# Patient Record
Sex: MALE | Race: OTHER | NOT HISPANIC OR LATINO
[De-identification: names, ages, dates, MRNs, and addresses within clinical notes are randomized per-mention and may not be internally consistent; named-entity substitution may affect disease eponyms.]

---

## 2017-09-19 PROBLEM — Z00.00 ENCOUNTER FOR PREVENTIVE HEALTH EXAMINATION: Status: ACTIVE | Noted: 2017-09-19

## 2017-09-29 ENCOUNTER — APPOINTMENT (OUTPATIENT)
Dept: NUCLEAR MEDICINE | Facility: CLINIC | Age: 72
End: 2017-09-29
Payer: MEDICARE

## 2017-09-29 ENCOUNTER — OUTPATIENT (OUTPATIENT)
Dept: OUTPATIENT SERVICES | Facility: HOSPITAL | Age: 72
LOS: 1 days | End: 2017-09-29

## 2017-09-29 DIAGNOSIS — Z85.46 PERSONAL HISTORY OF MALIGNANT NEOPLASM OF PROSTATE: ICD-10-CM

## 2017-09-29 PROCEDURE — 78306 BONE IMAGING WHOLE BODY: CPT | Mod: 26

## 2017-09-29 PROCEDURE — 78999 UNLISTED MISC PX DX NUC MED: CPT | Mod: 26

## 2017-10-09 ENCOUNTER — OUTPATIENT (OUTPATIENT)
Dept: OUTPATIENT SERVICES | Facility: HOSPITAL | Age: 72
LOS: 1 days | End: 2017-10-09
Payer: COMMERCIAL

## 2017-10-09 ENCOUNTER — APPOINTMENT (OUTPATIENT)
Dept: CT IMAGING | Facility: CLINIC | Age: 72
End: 2017-10-09
Payer: MEDICARE

## 2017-10-09 DIAGNOSIS — Z00.8 ENCOUNTER FOR OTHER GENERAL EXAMINATION: ICD-10-CM

## 2017-10-09 PROCEDURE — 71260 CT THORAX DX C+: CPT | Mod: 26

## 2017-10-09 PROCEDURE — 74177 CT ABD & PELVIS W/CONTRAST: CPT | Mod: 26

## 2017-10-10 PROCEDURE — 82565 ASSAY OF CREATININE: CPT

## 2017-10-10 PROCEDURE — 71260 CT THORAX DX C+: CPT

## 2017-10-10 PROCEDURE — 74177 CT ABD & PELVIS W/CONTRAST: CPT

## 2017-11-08 ENCOUNTER — RECORD ABSTRACTING (OUTPATIENT)
Age: 72
End: 2017-11-08

## 2017-11-08 DIAGNOSIS — Z85.46 PERSONAL HISTORY OF MALIGNANT NEOPLASM OF PROSTATE: ICD-10-CM

## 2017-11-08 DIAGNOSIS — I35.8 OTHER NONRHEUMATIC AORTIC VALVE DISORDERS: ICD-10-CM

## 2017-11-08 DIAGNOSIS — I35.1 NONRHEUMATIC AORTIC (VALVE) INSUFFICIENCY: ICD-10-CM

## 2017-11-08 DIAGNOSIS — I77.89 OTHER SPECIFIED DISORDERS OF ARTERIES AND ARTERIOLES: ICD-10-CM

## 2017-11-08 PROBLEM — K76.0 HEPATIC STEATOSIS: Status: ACTIVE | Noted: 2017-11-08

## 2017-11-09 RX ORDER — PNV NO.95/FERROUS FUM/FOLIC AC 28MG-0.8MG
100 TABLET ORAL DAILY
Refills: 0 | Status: ACTIVE | COMMUNITY

## 2017-11-15 ENCOUNTER — APPOINTMENT (OUTPATIENT)
Dept: CARDIOTHORACIC SURGERY | Facility: CLINIC | Age: 72
End: 2017-11-15
Payer: MEDICARE

## 2017-11-15 VITALS
SYSTOLIC BLOOD PRESSURE: 151 MMHG | BODY MASS INDEX: 28.82 KG/M2 | OXYGEN SATURATION: 95 % | HEART RATE: 49 BPM | RESPIRATION RATE: 15 BRPM | DIASTOLIC BLOOD PRESSURE: 82 MMHG | TEMPERATURE: 98.1 F | WEIGHT: 173 LBS | HEIGHT: 65 IN

## 2017-11-15 VITALS — SYSTOLIC BLOOD PRESSURE: 148 MMHG | DIASTOLIC BLOOD PRESSURE: 76 MMHG

## 2017-11-15 DIAGNOSIS — K76.0 FATTY (CHANGE OF) LIVER, NOT ELSEWHERE CLASSIFIED: ICD-10-CM

## 2017-11-15 PROCEDURE — 99205 OFFICE O/P NEW HI 60 MIN: CPT

## 2017-11-15 RX ORDER — CRANBERRY FRUIT EXTRACT 650 MG
CAPSULE ORAL DAILY
Refills: 0 | Status: DISCONTINUED | COMMUNITY
End: 2017-11-15

## 2019-01-02 ENCOUNTER — APPOINTMENT (OUTPATIENT)
Dept: RADIATION ONCOLOGY | Facility: CLINIC | Age: 74
End: 2019-01-02
Payer: MEDICARE

## 2019-01-02 ENCOUNTER — OTHER (OUTPATIENT)
Age: 74
End: 2019-01-02

## 2019-01-02 VITALS
BODY MASS INDEX: 29.45 KG/M2 | HEART RATE: 50 BPM | WEIGHT: 177 LBS | SYSTOLIC BLOOD PRESSURE: 138 MMHG | RESPIRATION RATE: 16 BRPM | DIASTOLIC BLOOD PRESSURE: 80 MMHG

## 2019-01-02 PROCEDURE — 99204 OFFICE O/P NEW MOD 45 MIN: CPT | Mod: 25

## 2019-01-02 RX ORDER — CRANBERRY FRUIT EXTRACT 650 MG
CAPSULE ORAL
Refills: 0 | Status: ACTIVE | COMMUNITY

## 2019-01-02 RX ORDER — METFORMIN HYDROCHLORIDE 500 MG/1
500 TABLET, COATED ORAL
Refills: 0 | Status: DISCONTINUED | COMMUNITY
End: 2019-01-02

## 2019-01-02 RX ORDER — LEUPROLIDE ACETATE 7.5 MG
7.5 KIT INTRAMUSCULAR
Refills: 0 | Status: ACTIVE | COMMUNITY

## 2019-01-02 RX ORDER — TAMSULOSIN HYDROCHLORIDE 0.4 MG/1
0.4 CAPSULE ORAL
Refills: 0 | Status: DISCONTINUED | COMMUNITY
End: 2019-01-02

## 2019-01-02 NOTE — REASON FOR VISIT
[Consideration for Non-Curative Therapy] : consideration for non-curative therapy for [Prostate Cancer] : prostate cancer [Family Member] : family member

## 2019-01-02 NOTE — REVIEW OF SYSTEMS
[Urinary Incontinence: Grade 1 - Occasional (e.g., with coughing, sneezing, etc.), pads not indicated] : Urinary Incontinence: Grade 1 - Occasional (e.g., with coughing, sneezing, etc.), pads not indicated [Urinary Tract Pain: Grade 2 - Moderate pain; limiting instrumental ADL] : Urinary Tract Pain: Grade 2 - Moderate pain; limiting instrumental ADL [Urinary Urgency: Grade 1 - Present] : Urinary Urgency: Grade 1 - Present [Urinary Frequency: Grade 2 - Limiting instrumental ADL; medical management indicated] : Urinary Frequency: Grade 2 - Limiting instrumental ADL; medical management indicated

## 2019-01-02 NOTE — VITALS
[Maximal Pain Intensity: 5/10] : 5/10 [Least Pain Intensity: 0/10] : 0/10 [Pain Description/Quality: ___] : Pain description/quality: [unfilled] [Pain Duration: ___] : Pain duration: [unfilled] [Pain Location: ___] : Pain Location: [unfilled] [Pain Interferes with ADLs] : Pain interferes with activities of daily living. [NoTreatment Scheduled] : no treatment scheduled [80: Normal activity with effort; some signs or symptoms of disease.] : 80: Normal activity with effort; some signs or symptoms of disease.  [ECOG Performance Status: 2 - Ambulatory and capable of all self care but unable to carry out any work activities] : Performance Status: 2 - Ambulatory and capable of all self care but unable to carry out any work activities. Up and about more than 50% of waking hours

## 2019-01-07 NOTE — HISTORY OF PRESENT ILLNESS
[FreeTextEntry1] : Sultan Laguerre is a 74 y/o male with CRPC, here with his daughter, to discuss possible Xofigo treatment.  Pt received ERT  in 2011 at [facility]  as well as Lupron.  Pt was feeling better, PSA went down and pt moved and did not followup for an extended time. Is on Firmagon and Xgeva. Since 2015 pt saw Dr Ajay Castillo, and had RT to T7-L1 spine mets with Dr Anmol Tate,completed 12/7/18.  As per Dr Anmol Salzaar's note of 12/7/18 , MRI showed mets at T8-T10 and T12 vertebrae. Axumin PET /CT 9/14/18 showed multiple bilateral pelvic adenopathy, T-9 met and possibly lung mets (nodules).Pt is on Lupron injections monthly and the PSA began rising,9.6 on 8/24/18,12.3 on 10/3/18. Pt's last PSA  12/20/18 was 22.4. Bone scan 9/19/17 showed T-9 metastasis. Pt has long-standing painful urination with urgency and some incontinence requiring a diaper. Denies incontinence,hematuria,melena. No spine/rib,limb bone pain.

## 2019-01-11 ENCOUNTER — OUTPATIENT (OUTPATIENT)
Dept: OUTPATIENT SERVICES | Facility: HOSPITAL | Age: 74
LOS: 1 days | Discharge: HOME | End: 2019-01-11

## 2019-01-11 DIAGNOSIS — C61 MALIGNANT NEOPLASM OF PROSTATE: ICD-10-CM

## 2019-01-16 ENCOUNTER — APPOINTMENT (OUTPATIENT)
Dept: HEMATOLOGY ONCOLOGY | Facility: CLINIC | Age: 74
End: 2019-01-16
Payer: MEDICARE

## 2019-01-16 VITALS
DIASTOLIC BLOOD PRESSURE: 81 MMHG | HEART RATE: 78 BPM | WEIGHT: 169.75 LBS | SYSTOLIC BLOOD PRESSURE: 143 MMHG | BODY MASS INDEX: 28.63 KG/M2 | OXYGEN SATURATION: 98 % | RESPIRATION RATE: 16 BRPM | HEIGHT: 64.57 IN | TEMPERATURE: 98 F

## 2019-01-16 DIAGNOSIS — Z83.79 FAMILY HISTORY OF OTHER DISEASES OF THE DIGESTIVE SYSTEM: ICD-10-CM

## 2019-01-16 DIAGNOSIS — C61 MALIGNANT NEOPLASM OF PROSTATE: ICD-10-CM

## 2019-01-16 DIAGNOSIS — Z84.89 FAMILY HISTORY OF OTHER SPECIFIED CONDITIONS: ICD-10-CM

## 2019-01-16 DIAGNOSIS — C77.2 MALIGNANT NEOPLASM OF PROSTATE: ICD-10-CM

## 2019-01-16 PROCEDURE — 99205 OFFICE O/P NEW HI 60 MIN: CPT

## 2019-01-16 NOTE — RESULTS/DATA
[FreeTextEntry1] : EXAM: NM BONE IMG WHOLE BODY \par EXAM: NM BONE SPECT CT \par PROCEDURE DATE: 09/29/2017 \par INTERPRETATION: RADIOPHARMACEUTICAL: 22.4 mCi Tc-99m HDP, I.V. \par CLINICAL INFORMATION: 72 year old male with prostate carcinoma complaining of back pain; referred to evaluate for osseous metastases. \par TECHNIQUE: Anterior and posterior whole body images were obtained approximately 2 hours following radiopharmaceutical administration. SPECT/CT \par of the thoracolumbar spine also was obtained. A computer workstation was used to obtain composite images for anatomic correlation by precisely \par overlying the SPECT and CT images to generate a fused SPECT/CT image. The CT protocol was optimized for SPECT attenuation correction and to provide \par anatomic detail for localization of SPECT abnormalities. The CT protocol was not designed to produce and cannot replace state-of- the-art diagnostic CT \par images with specific imaging protocols for different body parts and indications. \par COMPARISON: No previous bone scans were available for comparison. \par FINDINGS: There is increased radiopharmaceutical accumulation in the lower thoracic spine, approximately T9, which corresponds to a sclerotic lesion in \par this vertebra on the CT component of the examination. There are degenerative changes in the spine and major joints. There is physiologic distribution of \par radiopharmaceutical throughout the remainder of the imaged osseous structure\par Both kidneys are visualized and are symmetric in appearance. \par IMPRESSION: Abnormal bone scan. Metastatic disease involving the lower thoracic spine, approximately T9. The remainder the study is unremarkable. \par KEVEN LOERA M.D., CHIEF OF NUCLEAR MEDICINE \par This document has been electronically signed. Sep 29 2017 2:16PM \par *************************************************************\par EXAM: CT ABDOMEN AND PELVIS OC IC \par EXAM: CT CHEST IC \par PROCEDURE DATE: 10/09/2017 \par INTERPRETATION: CLINICAL INFORMATION: Prostate cancer. Ascending aorta dilatation. \par COMPARISON: None. \par PROCEDURE: CT of the Chest, Abdomen and Pelvis was performed with intravenous contrast. Intravenous contrast: 90 ml Omnipaque 350. 10 ml discarded. \par Oral contrast: positive contrast was administered. Sagittal and coronal reformats were performed. \par FINDINGS: \par CHEST: \par LUNGS AND LARGE AIRWAYS: Patent central airways. Numerous bilateral subcentimeter pulmonary nodules with rounded morphology. A few additional \par irregular pulmonary nodules including a 1.2 cm lesion in the right upper lobe (2:21) and a 1.2 cm lesion in the left upper lobe (2:33). \par PLEURA: No pleural effusion. \par VESSELS: Atherosclerotic changes of the aorta and coronary arteries. The ascending aorta measures up to 4.8 cm. \par HEART: Heart size is normal. No pericardial effusion. \par MEDIASTINUM AND THIAGO: Small calcified mediastinal and hilar nodes.. Small hiatal hernia. \par CHEST WALL AND LOWER NECK: Within normal limits. \par ABDOMEN AND PELVIS: \par LIVER: Hepatic steatosis. The caudate lobe is enlarged and there is an expanded periportal space. Findings which may be seen in cirrhosis. \par BILE DUCTS: Normal caliber. \par GALLBLADDER: Within normal limits. \par SPLEEN: Within normal limits. \par PANCREAS: Within normal limits. \par ADRENALS: Within normal limits. \par KIDNEYS/URETERS: Within normal limits. \par BLADDER: Mild diffuse wall thickening may be on the basis of underdistention. \par REPRODUCTIVE ORGANS: Slightly enlarged and heterogeneous in enhancement. \par BOWEL: No bowel obstruction. Appendix is normal. \par PERITONEUM: No ascites. \par VESSELS: Atherosclerotic changes. \par RETROPERITONEUM: A few subcentimeter short axis retroperitoneal and pelvic \par nodes are nonspecific. \par ABDOMINAL WALL: Within normal limits. \par BONES: Degenerative changes. Diffuse sclerosis of T9 concerning for metastatic disease. Old right 10th rib fracture. \par IMPRESSION: \par Diffuse sclerosis of T9 concerning for metastatic disease. \par Numerous pulmonary nodules as above, some of which demonstrate a rounded morphology concerning for metastatic disease. A few additional irregularly \par marginated pulmonary nodules are indeterminate. Other etiologies are not excluded. For short-term follow-up chest CT is recommended. \par Hepatic steatosis with concern for cirrhosis. \par Dilatation of the ascending aorta to 4.8 cm.. \par ARPIT RAMAN M.D., ATTENDING RADIOLOGIST \par This document has been electronically signed. Oct 10 2017 10:53AM

## 2019-01-16 NOTE — HISTORY OF PRESENT ILLNESS
[M: ___] : M[unfilled] [AJCC Stage: ____] : AJCC Stage: [unfilled] [de-identified] : Mr. Laguerre is seen in consultation on January 16, 2019. He was diagnosed with prostate cancer in July of 2011. A biopsy was performed at [facility]. His PSA was 268 at the time of his diagnosis. Every core was involved in almost 100% of each core was involved by Radha 8 tumor. There was perineural invasion as well as vascular invasion noted in the biopsies. He had no evidence of metastases at diagnosis, and he underwent radiation therapy at [facility]. It appears he received hormonal therapy during the treatment with radiation. He was treated with androgen deprivation therapy and a continual basis, and has recently been followed by Dr. Ajay Castillo. He was also seen by Dr. Jeaneth Otero on several occasions. He was referred for consideration of radium-223 and was not considered to be a candidate. I was asked to see him. They informed me today that he received his xtandi in the mail and it has been approved. He has not started it as yet. He has had intermittent dysuria since his diagnosis. In recent times she has nocturia once per hour. He has urgency and occasional incontinence. There is no hematuria. There are no bone pains.  \par Recent MRI revealed metastases at T8-T10 and T12 vertebra. Auxumin PET scan was performed on September 14 showed mild multiple bilateral pelvic lymphadenopathy. The results of the possibility of lung metastases, as he was described as having nodules. His PSA has been rising, and was 9.6 on August 24, 12.3 on October 3, 2018, and on December 20 was 22.4. A bone scan from September showed a T9 metastasis. 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

## 2019-01-16 NOTE — ASSESSMENT
[Palliative Care Plan] : not applicable at this time [FreeTextEntry1] : Mr. Laguerre was seen in consultation today. He was diagnosed with prostate cancer in 2011 and all cores in his biopsy were Radha 8. Almost every core was fully involved with both perineural and lymphovascular invasion noted and some cores. His presenting PSA was 268. Presumably, there was no evidence of metastases at that time as he underwent external beam radiation therapy along with androgen deprivation therapy. He was more or less on continuous androgen deprivation therapy with relatively low PSAs as per his recollection. Earlier this year, his PSA began to rise. He was found to have metastases in the thoracic spine, and he underwent 10 treatments of radiation therapy. He has seen Dr. Otero a few times in the past. He is managed by Dr. Castillo who has been administering degaralix and xgeva. He was referred for radium-223, but he has some lung nodules as well as adenopathy in the pelvis.\par \par He is not been on a second generation androgen inhibitor/androgen receptor modulator. Xtandi was recently approved, and he received a delivery of the product. He has not started taking it as yet.\par \par He reports no pains at this time. His appetite is good and there is no weight loss. He has had significant dysuria with urgency since his diagnosis. He does not appear that he has had a cystoscopy performed in a not certain as to why he has this pain since his radiation.\par \par I placed a phone call to the office of Dr. Castillo in order to discuss his case, but he is away at this time. I left a message with my cell phone number.\par \par I explained the natural history of prostate cancer and the mechanism of action of many of the drugs that we utilize to combat it. I agree that he should be placed on hormonal agent at this time. Dr. Castillo has chosen to give him xtandi. In my opinion, he would be better served by Zytiga.\par \par In the phase 3 chemotherapy naïve population, almost 1100 men with metastatic, asymptomatic or minimally symptomatic castration resistant prostate cancer were randomized to receive Zytiga plus prednisone or a placebo plus prednisone. The final survival analysis demonstrated that there was an overall survival benefit compared with placebo, at 34.7 months versus 3.3 months. The overall survival data shortened by the fact that 44% of patients assigned to placebo were able to crossover to the active drug, likely indicated that the true survival benefit may be much greater. Other end points were similarly favoring the use of Zytiga.\par \par Zytiga and prednisone has only minimal activity in patients who have progressed after treatment with enzalutamide, whereas patients receiving Zytiga first may still respond to enzalutamide as second line therapy, although the response is not as robust as in Zytiga naïve patients.\par \par In the PREVAIL trial, utilizing enzalutamide, 1700 men were randomized to receive this drug versus placebo. There was an overall survival benefit, with a median survival of 32.4 months compared to 30.2 months. The benefit is not as robust, and it is also hard by the fact that patients were able to crossover once the trial results were known, thus allowing active drug to be given to patient she previously received placebo.\par \par I favor Zytiga as the first treatment, as a result of the toxicity profile. There is significant lethargy and fatigue often seen with xtandi. Modulating the time of the day and sometimes ameliorate this problem, but there are many patient's who refused to take the drug due to its effect on the quality of life.\par \par I offered my opinion to Mr. Laguerre and his daughter-in-law. I told him I would speak with Dr. Castillo, and they could choose which recommendation I would like to follow.\par \par All questions were answered to the best of my ability and to their apparent satisfaction.

## 2019-01-16 NOTE — CONSULT LETTER
[Dear  ___] : Dear  [unfilled], [Consult Letter:] : I had the pleasure of evaluating your patient, [unfilled]. [Please see my note below.] : Please see my note below. [Consult Closing:] : Thank you very much for allowing me to participate in the care of this patient.  If you have any questions, please do not hesitate to contact me. [Sincerely,] : Sincerely, [DrAnnette  ___] : Dr. RASHEED [DrAnnette ___] : Dr. RASHEED

## 2019-01-16 NOTE — PHYSICAL EXAM
[Fully active, able to carry on all pre-disease performance without restriction] : Status 0 - Fully active, able to carry on all pre-disease performance without restriction [Normal] : affect appropriate [de-identified] : no gynecomastia [FreeTextEntry1] : deferred [de-identified] : circumcised, testes normal, glans normal

## 2019-01-16 NOTE — REVIEW OF SYSTEMS
[Fatigue] : fatigue [Wheezing] : wheezing [Cough] : cough [SOB on Exertion] : shortness of breath during exertion [Dysuria] : dysuria [Incontinence] : incontinence [Joint Pain] : joint pain [Negative] : Gastrointestinal [Fever] : no fever [Chills] : no chills [Night Sweats] : no night sweats [Recent Change In Weight] : ~T no recent weight change [Muscle Pain] : no muscle pain [Dizziness] : no dizziness [Anxiety] : no anxiety [Depression] : no depression [Hot Flashes] : no hot flashes [Muscle Weakness] : no muscle weakness [Easy Bleeding] : no tendency for easy bleeding [Easy Bruising] : no tendency for easy bruising [FreeTextEntry2] : appetite normal, lost 3 kilos recently [FreeTextEntry6] : SOB intermittent, mostly with stairs [FreeTextEntry9] : right shoulder, for about one year [de-identified] : no HA, no paresthesias

## 2019-01-16 NOTE — REASON FOR VISIT
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

## 2019-01-30 ENCOUNTER — CLINICAL ADVICE (OUTPATIENT)
Age: 74
End: 2019-01-30

## 2020-02-03 ENCOUNTER — APPOINTMENT (OUTPATIENT)
Dept: PULMONOLOGY | Facility: CLINIC | Age: 75
End: 2020-02-03
Payer: MEDICARE

## 2020-02-03 VITALS — WEIGHT: 162 LBS | BODY MASS INDEX: 28.35 KG/M2 | HEIGHT: 63.5 IN

## 2020-02-03 VITALS
TEMPERATURE: 97.7 F | WEIGHT: 164 LBS | HEART RATE: 85 BPM | OXYGEN SATURATION: 95 % | BODY MASS INDEX: 28.7 KG/M2 | RESPIRATION RATE: 18 BRPM | SYSTOLIC BLOOD PRESSURE: 123 MMHG | HEIGHT: 63.5 IN | DIASTOLIC BLOOD PRESSURE: 77 MMHG

## 2020-02-03 DIAGNOSIS — C79.51 MALIGNANT NEOPLASM OF PROSTATE: ICD-10-CM

## 2020-02-03 DIAGNOSIS — C61 MALIGNANT NEOPLASM OF PROSTATE: ICD-10-CM

## 2020-02-03 DIAGNOSIS — I71.2 THORACIC AORTIC ANEURYSM, W/OUT RUPTURE: ICD-10-CM

## 2020-02-03 DIAGNOSIS — R91.8 OTHER NONSPECIFIC ABNORMAL FINDING OF LUNG FIELD: ICD-10-CM

## 2020-02-03 DIAGNOSIS — E11.9 TYPE 2 DIABETES MELLITUS W/OUT COMPLICATIONS: ICD-10-CM

## 2020-02-03 DIAGNOSIS — J44.9 CHRONIC OBSTRUCTIVE PULMONARY DISEASE, UNSPECIFIED: ICD-10-CM

## 2020-02-03 PROCEDURE — 94726 PLETHYSMOGRAPHY LUNG VOLUMES: CPT

## 2020-02-03 PROCEDURE — ZZZZZ: CPT

## 2020-02-03 PROCEDURE — 94729 DIFFUSING CAPACITY: CPT

## 2020-02-03 PROCEDURE — 94060 EVALUATION OF WHEEZING: CPT

## 2020-02-03 PROCEDURE — 99204 OFFICE O/P NEW MOD 45 MIN: CPT | Mod: 25

## 2020-02-04 PROBLEM — J44.9 CHRONIC OBSTRUCTIVE PULMONARY DISEASE: Status: ACTIVE | Noted: 2020-02-04

## 2020-02-04 PROBLEM — E11.9 CONTROLLED TYPE 2 DIABETES MELLITUS WITHOUT COMPLICATION, WITHOUT LONG-TERM CURRENT USE OF INSULIN: Status: ACTIVE | Noted: 2020-02-04

## 2020-02-04 RX ORDER — ENZALUTAMIDE 40 MG/1
40 CAPSULE ORAL
Refills: 0 | Status: ACTIVE | COMMUNITY
Start: 2020-02-04

## 2020-02-04 NOTE — PHYSICAL EXAM
[No Acute Distress] : no acute distress [Well Nourished] : well nourished [Well Groomed] : well groomed [No Deformities] : no deformities [Normal Oropharynx] : normal oropharynx [Normal Appearance] : normal appearance [Well Developed] : well developed [No Neck Mass] : no neck mass [Supple] : supple [No JVD] : no jvd [Normal S1, S2] : normal s1, s2 [Normal Rate/Rhythm] : normal rate/rhythm [No Acc Muscle Use] : no acc muscle use [No Resp Distress] : no resp distress [Normal Rhythm and Effort] : normal rhythm and effort [No Abnormalities] : no abnormalities [Clear to Auscultation Bilaterally] : clear to auscultation bilaterally [Normal Gait] : normal gait [No Clubbing] : no clubbing [No Cyanosis] : no cyanosis [No Edema] : no edema [No Focal Deficits] : no focal deficits [Normal Color/ Pigmentation] : normal color/ pigmentation [FROM] : FROM [Normal Mood] : normal mood [Oriented x3] : oriented x3 [No Motor Deficits] : no motor deficits [Normal Affect] : normal affect [TextBox_11] : NCAT, EOMI, anicteric, MMM, nares clear, trachea midline

## 2020-02-04 NOTE — REASON FOR VISIT
[Initial] : an initial visit [Abnormal CXR/ Chest CT] : an abnormal CXR/ chest CT [Pre-op Risk Stratification] : pre-op risk stratification [Family Member] : family member [TextBox_44] : prostate cancer - preop prostatectomy

## 2020-02-04 NOTE — ASSESSMENT
[FreeTextEntry1] : 74M DM2, metastatic prostate cancer with pulmonary nodules presenting for preoperative evaluation prior to prostatectomy\par \par 1. Preop pulmonary evaluation - patient for planned prostatectomy. No absolute pulmonary contraindication to proceeding with planned prostatectomy. Based on ARISCAT patient is low risk for perioperative pulmonary complications including respiratory failure and death (1.6%). He does not require further medications or testing from a pulmonary standpoint. \par - Patient should be evaluated by anesthesiologist prior to procedure for determination of anesthetic regimen. Bronchodilators in the gabino-extubation period will likely be of benefit. Patient should be fully awake prior to extubation attempt. DVT prophylaxis as per the surgical team. \par - Patient should be evaluated by his cardiologist given thoracic aortic aneurysm. However, it does look like he was evaluated by Cardiothoracic Surgery in the past (Dr. Roberts in 2017) and at that time he was recommended for conservative management with a reported aortic dilatation of 5.2 cm which is similar to what is seen on most recent CT scan\par - No role for corticosteroid therapy or supplemental O2 at this time.\par \par 2. Pulmonary Nodules - likely related to metastatic prostate cancer. Patient had similar nodules on CT imaging from 2017 in our records. He should followup with Dr. Castillo () or Dr. Edwards (Medical Oncology) regarding treatment for his metastatic prostate cancer.\par - Given these nodules are not significantly changed from prior would not suggest proceeding with biopsy unless further tissue is required to guide his oncologic management. Would suggest IR evaluation for transthoracic biopsy if necessary as the nodules appear peripheral and would likely be accessible transthoracically.\par - Patient should be followed with regular CT imaging if patient desires further treatment.\par \par 3. Pulmonary Function Testing - mild obstructive ventilatory defect with mild reduction in diffusion capacity. Patient is a never smoker and denies any pulmonary symptoms. Patient should have yearly PFTs. We will hold off bronchodilator therapy at this time given his absence of symptoms.\par - Patient, or family, will call me if he starts to develop symptoms and we will consider trial of bronchodilator.\par \par 4. Followup as needed.

## 2020-02-04 NOTE — REVIEW OF SYSTEMS
[Recent Wt Loss (___ Lbs)] : ~T recent [unfilled] lb weight loss [Nocturia] : nocturia [Frequency] : dysuria [Urgency] : frequency [Diabetes] : diabetes [Fatigue] : no fatigue [Fever] : no fever [Poor Appetite] : no poor appetite [Chills] : no chills [Dry Eyes] : no dry eyes [Ear Disturbance] : no ear disturbance [Epistaxis] : no epistaxis [Sore Throat] : no sore throat [Dry Mouth] : no dry mouth [Postnasal Drip] : no postnasal drip [Nasal Congestion] : no nasal congestion [Mouth Ulcers] : no mouth ulcers [Chest Tightness] : no chest tightness [Hemoptysis] : no hemoptysis [Cough] : no cough [Frequent URIs] : no frequent URIs [Sputum] : no sputum [Wheezing] : no wheezing [A.M. Dry Mouth] : no a.m. dry mouth [Dyspnea] : no dyspnea [Chest Discomfort] : no chest discomfort [SOB on Exertion] : no sob on exertion [Leg Cramps] : no leg cramps [Claudication] : no claudication [Edema] : no edema [Syncope] : no syncope [Orthopnea] : no orthopnea [Itchy Eyes] : no itchy eyes [Watery Eyes] : no watery eyes [Nasal Discharge] : no nasal discharge [Angioedema] : no angioedema [Abdominal Pain] : no abdominal pain [GERD] : no gerd [Vomiting] : no vomiting [Nausea] : no nausea [Diarrhea] : no diarrhea [Arthralgias] : no arthralgias [Dysuria] : no urgency [Food Intolerance] : no food intolerance [Chronic Pain] : no chronic pain [Myalgias] : no myalgias [Rash] : no rash [Easy Bruising] : no easy bruising [Focal Weakness] : no focal weakness [Blood Transfusion] : no blood transfusion [Dizziness] : no dizziness [Seizures] : no seizures [Numbness] : no numbness [Anxiety] : no anxiety [Depression] : no depression [Thyroid Problem] : no thyroid problem

## 2020-02-04 NOTE — CONSULT LETTER
[Consult Letter:] : I had the pleasure of evaluating your patient, [unfilled]. [Dear  ___] : Dear  [unfilled], [Please see my note below.] : Please see my note below. [Sincerely,] : Sincerely, [DrAnnette  ___] : Dr. RASHEED [FreeTextEntry3] : Clemente Temple MD\par

## 2020-02-04 NOTE — HISTORY OF PRESENT ILLNESS
[Never] : never [Difficulty Breathing During Exertion] : dyspnea on exertion [Feelings Of Weakness On Exertion] : exercise intolerance [Cough] : coughing [Wheezing] : wheezing [Nasal Passage Blockage (Stuffiness)] : edema [Nonspecific Pain, Swelling, And Stiffness] : chest pain [Wt Loss ___ kg] : Recent [unfilled] kg(s) weight loss [0  -  Nothing at all] : 0, nothing at all [TextBox_4] : 74M metastatic prostate cancer presenting for initial pulmonary evaluation prior to planned prostatectomy. Patient has a history of prostate cancer diagnosed in 2011 (after having an elevated PSA - 268 and via biospy).  He was initially treated with radiation therapy and androgen deprivation therapy by his urologist (Dr. Castillo). He has been followed since that time. He has also now been on Xtandi for the last few years. He has had progressive urinary symptoms over the last year with increased urgency and intermittent incontenenc.e He is now being planned for a prostatectomy to help with his urinary issues which are his most concerning complaint.\par \par He was seen by Dr. Edwards (medical oncology) in 2019 for evaluation of further therapy. At that time he was known to have bone mets to his thoracic vertebra as well as lung nodules suspicious for metastatic disease. He had a stage IV cancer. He has had no further followup regarding his pulmonary nodules. He has been maintained on Xtandi therapy.\par \par In preparation for his prostatectomy patient was asked to have a CXR which was done 3/29/19. THat CXR was read as hyperinflated lungs consistent with COPD as well as vague areas of nodularity identified left midlung and right upper lobe. Based on this CXR he was sent for a CT chest which was done 1/23/2020 that did not show evidence of emphysema but did notice lung nodules. \par \par He was referred for pulmonary evaluation preoperatively. Patient/family were aware of the lung nodules and did not believe that they were in the office to discuss further workup of these. Mr. Laguerre himself told me that his only problem was urination and he was hoping to have the surgery done to improve his quality of life. He feels that his breathing is not of issue and therefore would not want to have any further workup done at this time regarding his lung disease.\par \par He denies shortness of breath, cough, or sputum production. He denies hemoptysis. He has had some weight loss of about 5-10 pounds. He denies chest pain or palpitations. He denies nausea or vomiting. He denies sick contacts or recent travels. He is retired and did not have any previous occupational exposures [TextBox_12] : 10/19/17 - PROCEDURE DATE: 10/09/2017 \par \par \par \par INTERPRETATION: CLINICAL INFORMATION: Prostate cancer. Ascending aorta \par dilatation. \par \par COMPARISON: None. \par \par PROCEDURE: \par CT of the Chest, Abdomen and Pelvis was performed with intravenous contrast. \par Intravenous contrast: 90 ml Omnipaque 350. 10 ml discarded. \par Oral contrast: positive contrast was administered. \par Sagittal and coronal reformats were performed. \par \par FINDINGS: \par \par CHEST: \par \par LUNGS AND LARGE AIRWAYS: Patent central airways. Numerous bilateral \par subcentimeter pulmonary nodules with rounded morphology. A few additional \par irregular pulmonary nodules including a 1.2 cm lesion in the right upper \par lobe (2:21) and a 1.2 cm lesion in the left upper lobe (2:33). \par PLEURA: No pleural effusion. \par VESSELS: Atherosclerotic changes of the aorta and coronary arteries. The \par ascending aorta measures up to 4.8 cm. \par HEART: Heart size is normal. No pericardial effusion. \par MEDIASTINUM AND THIAGO: Small calcified mediastinal and hilar nodes.. Small \par hiatal hernia. \par CHEST WALL AND LOWER NECK: Within normal limits. \par \par ABDOMEN AND PELVIS: \par \par LIVER: Hepatic steatosis. The caudate lobe is enlarged and there is an \par expanded periportal space. Findings which may be seen in cirrhosis. \par BILE DUCTS: Normal caliber. \par GALLBLADDER: Within normal limits. \par SPLEEN: Within normal limits. \par PANCREAS: Within normal limits. \par ADRENALS: Within normal limits. \par KIDNEYS/URETERS: Within normal limits. \par \par BLADDER: Mild diffuse wall thickening may be on the basis of underdistention. \par REPRODUCTIVE ORGANS: Slightly enlarged and heterogeneous in enhancement. \par \par BOWEL: No bowel obstruction. Appendix is normal. \par PERITONEUM: No ascites. \par VESSELS: Atherosclerotic changes. \par RETROPERITONEUM: A few subcentimeter short axis retroperitoneal and pelvic \par nodes are nonspecific. \par ABDOMINAL WALL: Within normal limits. \par BONES: Degenerative changes. Diffuse sclerosis of T9 concerning for \par metastatic disease. Old right 10th rib fracture. \par \par IMPRESSION: \par \par Diffuse sclerosis of T9 concerning for metastatic disease. \par \par Numerous pulmonary nodules as above, some of which demonstrate a rounded \par morphology concerning for metastatic disease. A few additional irregularly \par marginated pulmonary nodules are indeterminate. Other etiologies are not \par excluded. For short-term follow-up chest CT is recommended. \par \par Hepatic steatosis with concern for cirrhosis. \par \par Dilatation of the ascending aorta to 4.8 cm..  [TextBox_42] : 1/23/2020 - [facility] - No axillary, mediastinal, or hilar adenopathy is noted. Aneurysmal dilatation of the ascending aorta is noted measuring up to 5.2 cm in maximum dimension. Atherosclerotic changes of the thoracic aorta are noted. The visualized upper abdominal visceral structures are intact. Evaluation at lung windows reveals multiple nodules bilaterally. A spiculated nodule in the RUL at the level of the aortic arch measures 1.3 cm in size. An irregular nodule in the [place] at the level of the left hilum measures 1.2 cm in size. The remaining nodules measure between 2 and 9 mm in size. Several calcified granulomasare noted posteriorly in the RLL at the level of the right hilum. Areas of nodular pleural thickening are identified along the major fissure on the left. Minimal parenchymal scarring or atelectasis is noted at both lung bases. No pleural fluid collections are noted. Evaluation at bone windos reveals extensive bony sclerotic changes involving the T9 vertebral body. Spondylitic changes of the spine are noted as well

## 2020-09-04 ENCOUNTER — APPOINTMENT (OUTPATIENT)
Dept: CARDIOTHORACIC SURGERY | Facility: CLINIC | Age: 75
End: 2020-09-04
Payer: MEDICARE

## 2020-09-04 VITALS
BODY MASS INDEX: 29.41 KG/M2 | DIASTOLIC BLOOD PRESSURE: 71 MMHG | RESPIRATION RATE: 16 BRPM | TEMPERATURE: 98.2 F | OXYGEN SATURATION: 98 % | SYSTOLIC BLOOD PRESSURE: 114 MMHG | WEIGHT: 166 LBS | HEIGHT: 63 IN | HEART RATE: 74 BPM

## 2020-09-04 DIAGNOSIS — I71.9 AORTIC ANEURYSM OF UNSPECIFIED SITE, W/OUT RUPTURE: ICD-10-CM

## 2020-09-04 PROCEDURE — 99215 OFFICE O/P EST HI 40 MIN: CPT

## 2020-09-04 RX ORDER — ALFUZOSIN HYDROCHLORIDE 10 MG/1
10 TABLET, EXTENDED RELEASE ORAL
Refills: 0 | Status: COMPLETED | COMMUNITY
End: 2020-09-04

## 2020-09-04 RX ORDER — ATORVASTATIN CALCIUM 10 MG/1
10 TABLET, FILM COATED ORAL
Refills: 0 | Status: COMPLETED | COMMUNITY
End: 2020-09-04

## 2020-09-04 RX ORDER — METFORMIN HYDROCHLORIDE 500 MG/1
500 TABLET, COATED ORAL
Refills: 0 | Status: COMPLETED | COMMUNITY
Start: 2020-02-04 | End: 2020-09-04

## 2020-09-04 NOTE — PHYSICAL EXAM
[Sclera] : the sclera and conjunctiva were normal [Neck Appearance] : the appearance of the neck was normal [Neck Cervical Mass (___cm)] : no neck mass was observed [Jugular Venous Distention Increased] : there was no jugular-venous distention [] : no respiratory distress [Exaggerated Use Of Accessory Muscles For Inspiration] : no accessory muscle use [Respiration, Rhythm And Depth] : normal respiratory rhythm and effort [Heart Rate And Rhythm] : heart rate was normal and rhythm regular [Apical Impulse] : the apical impulse was normal [Heart Sounds] : normal S1 and S2 [Heart Sounds Gallop] : no gallops [Examination Of The Chest] : the chest was normal in appearance [Breast Appearance] : normal in appearance [Abdomen Soft] : soft [Bowel Sounds] : normal bowel sounds [Abdomen Tenderness] : non-tender [No CVA Tenderness] : no ~M costovertebral angle tenderness [Involuntary Movements] : no involuntary movements were seen [Skin Color & Pigmentation] : normal skin color and pigmentation [Skin Turgor] : normal skin turgor [No Focal Deficits] : no focal deficits [Impaired Insight] : insight and judgment were intact [Oriented To Time, Place, And Person] : oriented to person, place, and time [Affect] : the affect was normal [Mood] : the mood was normal [Right Carotid Bruit] : no bruit heard over the right carotid [Left Carotid Bruit] : no bruit heard over the left carotid [FreeTextEntry1] : Deferred

## 2020-09-04 NOTE — ASSESSMENT
[FreeTextEntry1] : 74M with known aortic aneurysm and metastatic prostate cancer here for clearance prior to proceeding with his oral and prostate surgery.  Repeat CTA shows a 5.2cm aortic aneurysm, increased from 4.8cm in 2017.  Pt denies any chest pain.  Had discussion with patient and daughter in law regarding the concern that there is interval growth of the aneurysm and that he is approaching the size where aortic surgery would be considered.  Given the patients advanced dental and prostate disease, would recommend the patient proceed with oral and prostate surgery.  As the patient is at increased risk of aortic aneurysmal rupture perioperatively, would recommend strict blood pressure and heart rate control perioperatively.  Recommend patient obtain a repeat CTA in 3 months to follow up his aortic aneurysm.  Pt should also obtain a TTE at that time to evaluate AI.   Pt and family will discuss if they would want to pursue open heart surgery should that be necessary.  Regardless, the pt would need to have his dental and prostate issues addressed prior to any open heart surgery.\par \par

## 2020-09-04 NOTE — REVIEW OF SYSTEMS
[Feeling Tired] : feeling tired [Constipation] : constipation [As Noted in HPI] : as noted in HPI [Dysuria] : dysuria [Incontinence] : incontinence [Hesitancy] : urinary hesitancy [Negative] : Heme/Lymph [Fever] : no fever [Chills] : no chills [Abdominal Pain] : no abdominal pain [Vomiting] : no vomiting [Diarrhea] : no diarrhea [FreeTextEntry8] : Greenberg insertion [FreeTextEntry7] : H

## 2020-09-04 NOTE — DATA REVIEWED
[FreeTextEntry1] : CT Chest 1/23/2020: Ascending aortic aneurysm measuring 5.2cm. Multiple lung nodules measuring up to 1.3 cm suspicious for malignancy. [place] change to T9 suspicious of metastatic disease\par \par CT Chest 10/9/2017: Diffuse sclerosis of T9 concerning for metastatic disease. Numerous pulmonary nodules as above, some of which demonstrate a rounded morphology concerning for metastatic disease. A few additional irregularly marginated pulmonary nodules are indeterminate. Other etiologies are not excluded. For short-term follow-up chest CT is recommended.\par \par Hepatic steatosis with concern for cirrhosis.\par \par Dilatation of the ascending aorta to 4.8 cm..\par \par

## 2020-11-09 ENCOUNTER — APPOINTMENT (OUTPATIENT)
Dept: COLORECTAL SURGERY | Facility: CLINIC | Age: 75
End: 2020-11-09
Payer: MEDICARE

## 2020-11-09 VITALS
WEIGHT: 162 LBS | HEIGHT: 63 IN | DIASTOLIC BLOOD PRESSURE: 72 MMHG | BODY MASS INDEX: 28.7 KG/M2 | HEART RATE: 70 BPM | TEMPERATURE: 97.5 F | SYSTOLIC BLOOD PRESSURE: 163 MMHG

## 2020-11-09 DIAGNOSIS — K62.89 OTHER SPECIFIED DISEASES OF ANUS AND RECTUM: ICD-10-CM

## 2020-11-09 DIAGNOSIS — K60.2 ANAL FISSURE, UNSPECIFIED: ICD-10-CM

## 2020-11-09 PROCEDURE — 99203 OFFICE O/P NEW LOW 30 MIN: CPT

## 2020-11-09 PROCEDURE — 99072 ADDL SUPL MATRL&STAF TM PHE: CPT

## 2020-11-09 RX ORDER — HYDROCORTISONE 25 MG/G
2.5 CREAM TOPICAL
Qty: 30 | Refills: 3 | Status: ACTIVE | COMMUNITY
Start: 2020-11-09 | End: 1900-01-01

## 2023-04-13 NOTE — HISTORY OF PRESENT ILLNESS
[Diabetes Mellitus] : Diabetes Mellitus [Time Spent: ___ minutes] : I have spent [unfilled] minutes of time on the encounter. [FreeTextEntry1] : Mr. Laguerre is a 74 year old male who presents today for pre op clearance for prostatectomy, dental work and also reports he has an anal skin tag that his PCP told him he would eventually need surgery for as well. He presents today with his daughter, Jyoti. PMH includes ascending aortic aneurysm, metastatic prostrate CA (dx in 2011 initially treated with radiation and androgen deprivation therapy initially and  has been on Xtandi the past few years with metastasis to lungs and thoracic vertebrae) DMII, COPD and hepatic steatosis. Pt was initially seen in our office on 11/15/2017 by Dr. Roberts for ascending aortic aneurysm that was incidentally found upon workup for prostrate cancer. At that time due to his metastatic prostrate disease, close follow up in our aortic registry was recommended.  Pt had follow up CT a month later in Oct 2017 that showed multiple pulmonary nodules suspicious for metastatic disease and ascending aortic aneurysm of 4.8cm however has not had follow up CT since 1/23/2020 at OSH. He was seen by Dr. Edwards (medical oncology) in January 2019. At that time he was known to have bone mets to his thoracic vertebra as well as lung nodules suspicious for metastatic disease. He has had progressive urinary symptoms over the last year and is being planned for a prostatectomy to help his urinary symptoms. Currently has an indwelling garcia catheter for the past 6 months due to worsening symptoms. Pt saw pulmonologist Dr. Temple on 2/3/2020 for preop pulm clearance in preparation for his prostatectomy who did not recommend biopsy of pulmonary nodules as the nodules on CT from 1/23/2020 (done at OSH and no report) where unchanged from CT on 10/2017. \par \par He presents today with his daughter Jyoti and reports he has been feeling more tooth/jaw pain over the past month after having a tooth pulled at his dentist.  This tooth/jaw pain has only allowed him to eat soft diet and he has been taking Motrin everyday.  His oral surgeon put him on oral clindamycin (unsure of dose) 2-3 weeks ago.  He is awaiting oral surgery before abx will be stopped. He reports increasing weakness and SOB with exertion. He denies chest pain, cough, hemoptysis, palpitations, swelling, weight gain, fever or chills. \par \par PCP: Dr. Perales ([facility] in [place]) [phone]\par Urologist: Dr. [facility]ay [facility] ([place]) Phone: [phone]\par Oral Surgeon: Dr. Prem Barraza. Phone: [phone]\par  [Dyslipidemia] : no dyslipidemia [Home Oxygen] : no home oxygen use [Liver Disease] : no liver disease [Unresponsive Neurologic State] : not in a unresponsive neurologic state [Cerebrovascular Disease] : no cerebrovascular disease [Prior Myocardial Infarction] : No prior myocardial infarction [Prior Heart Failure] : no prior heart failure [V tach / V fib] :  but no V tach/V fib [A fib / A flutter] : no A fib or A flutter